# Patient Record
Sex: MALE | Race: WHITE | NOT HISPANIC OR LATINO | URBAN - METROPOLITAN AREA
[De-identification: names, ages, dates, MRNs, and addresses within clinical notes are randomized per-mention and may not be internally consistent; named-entity substitution may affect disease eponyms.]

---

## 2017-04-19 ENCOUNTER — IMPORTED ENCOUNTER (OUTPATIENT)
Dept: URBAN - METROPOLITAN AREA CLINIC 9 | Facility: CLINIC | Age: 77
End: 2017-04-19

## 2018-03-23 ENCOUNTER — IMPORTED ENCOUNTER (OUTPATIENT)
Dept: URBAN - METROPOLITAN AREA CLINIC 9 | Facility: CLINIC | Age: 78
End: 2018-03-23

## 2018-04-09 ENCOUNTER — IMPORTED ENCOUNTER (OUTPATIENT)
Dept: URBAN - METROPOLITAN AREA CLINIC 9 | Facility: CLINIC | Age: 78
End: 2018-04-09

## 2018-10-04 ENCOUNTER — IMPORTED ENCOUNTER (OUTPATIENT)
Dept: URBAN - METROPOLITAN AREA CLINIC 9 | Facility: CLINIC | Age: 78
End: 2018-10-04

## 2018-10-09 ENCOUNTER — IMPORTED ENCOUNTER (OUTPATIENT)
Dept: URBAN - METROPOLITAN AREA CLINIC 9 | Facility: CLINIC | Age: 78
End: 2018-10-09

## 2019-04-10 ENCOUNTER — IMPORTED ENCOUNTER (OUTPATIENT)
Dept: URBAN - METROPOLITAN AREA CLINIC 9 | Facility: CLINIC | Age: 79
End: 2019-04-10

## 2019-10-03 ENCOUNTER — IMPORTED ENCOUNTER (OUTPATIENT)
Dept: URBAN - METROPOLITAN AREA CLINIC 9 | Facility: CLINIC | Age: 79
End: 2019-10-03

## 2020-01-08 ENCOUNTER — IMPORTED ENCOUNTER (OUTPATIENT)
Dept: URBAN - METROPOLITAN AREA CLINIC 9 | Facility: CLINIC | Age: 80
End: 2020-01-08

## 2021-02-23 ENCOUNTER — IMPORTED ENCOUNTER (OUTPATIENT)
Dept: URBAN - METROPOLITAN AREA CLINIC 9 | Facility: CLINIC | Age: 81
End: 2021-02-23

## 2021-03-10 ENCOUNTER — IMPORTED ENCOUNTER (OUTPATIENT)
Dept: URBAN - METROPOLITAN AREA CLINIC 9 | Facility: CLINIC | Age: 81
End: 2021-03-10

## 2021-03-10 PROBLEM — H02.122: Noted: 2021-03-10

## 2021-03-10 PROBLEM — H02.125: Noted: 2021-03-10

## 2021-06-29 NOTE — PATIENT DISCUSSION
06/29/2021[id number]&nbsp; &nbsp; &nbsp; rgr

## 2021-10-16 ASSESSMENT — VISUAL ACUITY
OD_SC: 20/25 - SN
OS_SC: 20/25 +2 SN
OS_SC: 20/30 SN
OS_SC: 20/25 - SN
OS_SC: 20/25 - SN
OD_SC: 20/25 -2 SN
OS_SC: 20/25 SN
OD_SC: 20/25 SN
OS_SC: 20/30 SN
OD_SC: 20/30 -2 SN
OD_SC: 20/25 -2 SN
OD_SC: 20/25 - SN
OD_SC: 20/30 + SN
OS_SC: 20/30 + SN

## 2021-10-16 ASSESSMENT — TONOMETRY
OS_IOP_MMHG: 16
OD_IOP_MMHG: 19
OS_IOP_MMHG: 15
OD_IOP_MMHG: 13
OS_IOP_MMHG: 15
OS_IOP_MMHG: 15
OD_IOP_MMHG: 14
OD_IOP_MMHG: 16
OS_IOP_MMHG: 18
OS_IOP_MMHG: 18
OD_IOP_MMHG: 17
OD_IOP_MMHG: 13
OS_IOP_MMHG: 12
OD_IOP_MMHG: 17

## 2022-03-03 ENCOUNTER — TECH ONLY (OUTPATIENT)
Dept: URBAN - METROPOLITAN AREA CLINIC 14 | Facility: CLINIC | Age: 82
End: 2022-03-03

## 2022-03-03 DIAGNOSIS — H40.013: ICD-10-CM

## 2022-03-03 PROCEDURE — 92083 EXTENDED VISUAL FIELD XM: CPT

## 2022-04-12 ENCOUNTER — MOHS SURGERY-ROUTINE (OUTPATIENT)
Dept: URBAN - METROPOLITAN AREA CLINIC 12 | Facility: CLINIC | Age: 82
Setting detail: DERMATOLOGY
End: 2022-04-12

## 2022-04-12 DIAGNOSIS — Z85.828 PERSONAL HISTORY OF OTHER MALIGNANT NEOPLASM OF SKIN: ICD-10-CM

## 2022-04-12 DIAGNOSIS — L82.1 OTHER SEBORRHEIC KERATOSIS: ICD-10-CM

## 2022-04-12 DIAGNOSIS — L90.5 SCAR CONDITIONS AND FIBROSIS OF SKIN: ICD-10-CM

## 2022-04-12 PROCEDURE — 17311 MOHS 1 STAGE H/N/HF/G: CPT

## 2022-04-27 ENCOUNTER — OTHER- (OUTPATIENT)
Dept: URBAN - METROPOLITAN AREA CLINIC 12 | Facility: CLINIC | Age: 82
Setting detail: DERMATOLOGY
End: 2022-04-27

## 2022-04-27 DIAGNOSIS — L57.0 ACTINIC KERATOSIS: ICD-10-CM

## 2022-04-27 PROCEDURE — 99024 POSTOP FOLLOW-UP VISIT: CPT

## 2022-08-08 NOTE — PATIENT DISCUSSION
Recommended weight and BMI control through healthy diet and exercise, green leafy veggies, UV protection, and not smoking. Reviewed the benefits of AREDS II VITAMINS VERUS GENOTYPE directed vitamin therapy, and recommended following one or the other to try and prevent the progression of the disease. Reviewed the importance of daily monitoring of the vision in each eye independently, along with the use of the Amsler grid daily and instructed patient to call and return immediately for any new changes in their vision or on the Amsler grid. 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 Improved none

## 2022-09-21 ENCOUNTER — ESTABLISHED PATIENT (OUTPATIENT)
Dept: URBAN - METROPOLITAN AREA CLINIC 14 | Facility: CLINIC | Age: 82
End: 2022-09-21

## 2022-09-21 DIAGNOSIS — E11.9: ICD-10-CM

## 2022-09-21 DIAGNOSIS — H04.123: ICD-10-CM

## 2022-09-21 DIAGNOSIS — H40.013: ICD-10-CM

## 2022-09-21 DIAGNOSIS — H25.13: ICD-10-CM

## 2022-09-21 PROCEDURE — 92133 CPTRZD OPH DX IMG PST SGM ON: CPT

## 2022-09-21 PROCEDURE — 92014 COMPRE OPH EXAM EST PT 1/>: CPT

## 2022-09-21 ASSESSMENT — VISUAL ACUITY
OD_SC: 20/25
OS_SC: 20/25

## 2022-09-21 ASSESSMENT — TONOMETRY
OD_IOP_MMHG: 18
OS_IOP_MMHG: 16

## 2022-10-04 NOTE — PATIENT DISCUSSION
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

## 2022-10-31 NOTE — PATIENT DISCUSSION
10 31 [place] 20 FROM CATARACTS.   ? COMPONENT FROM VF CHANGES WHICH HAVE BEEN VARIABLE IN THE PAST - RECOM RONNIE AGUILAR.

## 2022-10-31 NOTE — PATIENT DISCUSSION
10 31 22 VARIABLE IN PAST - NO RECURRENT MENINGIOMA - NOT PROGRESSIN SINCE 10 4 22 - TO FOLLOW AND REEVAL IN 6 MONTHS.

## 2022-10-31 NOTE — PATIENT DISCUSSION
ANTIOXIDANTS WITHOUT ZINC based on Macular Risk test and recent data is recommended. Family and pt. Prompted for urine.

## 2022-11-15 NOTE — PATIENT DISCUSSION
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

## 2022-11-15 NOTE — PATIENT DISCUSSION
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 doubt relationship to meningioma but will retest vf every 6 months. had ct brain recently - unrevealing per patient. Followed at [place].

## 2022-11-15 NOTE — PATIENT DISCUSSION
Patient’s visual symptoms will NOT be adequately improved by a tolerable change in glasses or contacts. Counseled patient that removal of cataract is expected to improve vision. The patient feels that the cataract is significantly impacting daily activities and has elected to have cataract surgery. The risks, benefits, and alternatives to surgery were discussed. The patient elects to proceed with surgery. OD/OS. she understands limitations due to ERM. USE DICLOFENAC. Distance correction.

## 2022-12-20 NOTE — PATIENT DISCUSSION
MILD vf DEFECT PER DR Jhonatan Mcnair test. doubt relationship to meningioma but will retest vf every 6 months. had ct brain recently - unrevealing per patient. Followed at [place].

## 2022-12-20 NOTE — PATIENT DISCUSSION
VF CHANGES 2ND LEFT MENIGIOMA - REMOVED AT [place].  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

## 2022-12-29 NOTE — PATIENT DISCUSSION
MILD vf DEFECT PER DR Deepthi Parry test. doubt relationship to meningioma but will retest vf every 6 months. had ct brain recently - unrevealing per patient. Followed at [place].

## 2022-12-29 NOTE — PATIENT DISCUSSION
VF CHANGES 2ND LEFT MENIGIOMA - REMOVED AT [place].  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

## 2023-01-18 NOTE — PATIENT DISCUSSION
VF CHANGES 2ND LEFT MENIGIOMA - REMOVED AT [place].  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

## 2023-01-18 NOTE — PATIENT DISCUSSION
MILD vf DEFECT PER DR Gentry Newton test. doubt relationship to meningioma but will retest vf every 6 months. had ct brain recently - unrevealing per patient. Followed at [place].

## 2023-10-20 ENCOUNTER — ESTABLISHED PATIENT (OUTPATIENT)
Facility: LOCATION | Age: 83
End: 2023-10-20

## 2023-10-20 DIAGNOSIS — E11.9: ICD-10-CM

## 2023-10-20 DIAGNOSIS — H25.13: ICD-10-CM

## 2023-10-20 DIAGNOSIS — H04.123: ICD-10-CM

## 2023-10-20 DIAGNOSIS — H02.122: ICD-10-CM

## 2023-10-20 DIAGNOSIS — H02.125: ICD-10-CM

## 2023-10-20 DIAGNOSIS — H40.013: ICD-10-CM

## 2023-10-20 PROCEDURE — 92133 CPTRZD OPH DX IMG PST SGM ON: CPT

## 2023-10-20 PROCEDURE — 92014 COMPRE OPH EXAM EST PT 1/>: CPT

## 2023-10-20 ASSESSMENT — TONOMETRY
OS_IOP_MMHG: 16
OD_IOP_MMHG: 17

## 2023-10-20 ASSESSMENT — VISUAL ACUITY
OD_SC: 20/25-1
OS_SC: 20/25-1

## 2024-10-23 ENCOUNTER — COMPREHENSIVE EXAM (OUTPATIENT)
Dept: URBAN - METROPOLITAN AREA CLINIC 14 | Facility: CLINIC | Age: 84
End: 2024-10-23

## 2024-10-23 DIAGNOSIS — H02.122: ICD-10-CM

## 2024-10-23 DIAGNOSIS — H04.123: ICD-10-CM

## 2024-10-23 DIAGNOSIS — H40.013: ICD-10-CM

## 2024-10-23 DIAGNOSIS — E11.9: ICD-10-CM

## 2024-10-23 DIAGNOSIS — H25.13: ICD-10-CM

## 2024-10-23 DIAGNOSIS — H02.125: ICD-10-CM

## 2024-10-23 PROCEDURE — 92015 DETERMINE REFRACTIVE STATE: CPT

## 2024-10-23 PROCEDURE — 92014 COMPRE OPH EXAM EST PT 1/>: CPT

## 2024-10-23 PROCEDURE — 92133 CPTRZD OPH DX IMG PST SGM ON: CPT
